# Patient Record
Sex: FEMALE | Race: OTHER | RURAL
[De-identification: names, ages, dates, MRNs, and addresses within clinical notes are randomized per-mention and may not be internally consistent; named-entity substitution may affect disease eponyms.]

---

## 2022-04-22 ENCOUNTER — NEW PATIENT (OUTPATIENT)
Dept: RURAL CLINIC 3 | Facility: CLINIC | Age: 67
End: 2022-04-22

## 2022-04-22 DIAGNOSIS — H40.013: ICD-10-CM

## 2022-04-22 DIAGNOSIS — H25.13: ICD-10-CM

## 2022-04-22 DIAGNOSIS — E11.9: ICD-10-CM

## 2022-04-22 PROCEDURE — 92250 FUNDUS PHOTOGRAPHY W/I&R: CPT

## 2022-04-22 PROCEDURE — 92004 COMPRE OPH EXAM NEW PT 1/>: CPT

## 2022-04-22 ASSESSMENT — TONOMETRY
OS_IOP_MMHG: 13
OD_IOP_MMHG: 13

## 2022-04-22 ASSESSMENT — VISUAL ACUITY
OD_PH: 20/25
OS_SC: 20/40-1
OD_SC: 20/40-2

## 2022-04-22 NOTE — PATIENT DISCUSSION
Discussed diagnosis in detail with patient. Reviewed symptoms related to cataract progression. Recommend refer to Dr. Devin Velazquez for cataract evaluation. Patient elects to schedule.

## 2022-05-03 ENCOUNTER — FOLLOW UP (OUTPATIENT)
Dept: RURAL CLINIC 3 | Facility: CLINIC | Age: 67
End: 2022-05-03

## 2022-05-03 DIAGNOSIS — H40.013: ICD-10-CM

## 2022-05-03 PROCEDURE — 92083 EXTENDED VISUAL FIELD XM: CPT

## 2022-05-03 PROCEDURE — 99214 OFFICE O/P EST MOD 30 MIN: CPT

## 2022-05-03 PROCEDURE — 92133 CPTRZD OPH DX IMG PST SGM ON: CPT

## 2022-05-03 ASSESSMENT — TONOMETRY
OS_IOP_MMHG: 14
OD_IOP_MMHG: 15

## 2022-05-03 ASSESSMENT — VISUAL ACUITY
OD_PH: 20/25
OS_SC: 20/40
OS_PH: 20/20-2
OD_SC: 20/40-2

## 2022-05-03 NOTE — PATIENT DISCUSSION
Discussed diagnosis in detail with patient. Reviewed symptoms related to cataract progression. Recommend refer to Dr. Aditi Lott for cataract evaluation. Patient elects to schedule.

## 2022-06-20 ENCOUNTER — CONSULTATION/EVALUATION (OUTPATIENT)
Dept: RURAL CLINIC 3 | Facility: CLINIC | Age: 67
End: 2022-06-20

## 2022-06-20 VITALS
DIASTOLIC BLOOD PRESSURE: 104 MMHG | BODY MASS INDEX: 35.32 KG/M2 | HEIGHT: 65 IN | WEIGHT: 212 LBS | SYSTOLIC BLOOD PRESSURE: 209 MMHG | HEART RATE: 72 BPM

## 2022-06-20 DIAGNOSIS — H25.13: ICD-10-CM

## 2022-06-20 PROCEDURE — 99214 OFFICE O/P EST MOD 30 MIN: CPT

## 2022-06-20 ASSESSMENT — TONOMETRY
OS_IOP_MMHG: 17
OD_IOP_MMHG: 13

## 2022-06-20 ASSESSMENT — VISUAL ACUITY
OD_PAM: 20/20
OS_BAT: 20/40
OS_SC: 20/20
OS_AM: 20/20
OS_CC: 20/40
OD_BAT: 20/60
OD_PH: 20/20-1
OD_SC: 20/60-2
OD_CC: 20/30

## 2022-06-20 NOTE — PATIENT DISCUSSION
(Surgical) Visually Significant (secondary to glare), discussed the risks, benefits, alternatives, and limitations of cataract surgery. The patient stated a full understanding and a desire to proceed with the procedure. The patient will need to return for pre-op appointment with cataract measurements and to have any additional questions answered, and start pre-operative eye drops as directed. Pt elects to proceed with OD first and then OS after. Discussed LRI OU with patient & lens benefits. Advised need for reading glasses. Pt elects Stand/Trad OU & discussed need for bifocals s/p with this.